# Patient Record
Sex: FEMALE | Race: WHITE
[De-identification: names, ages, dates, MRNs, and addresses within clinical notes are randomized per-mention and may not be internally consistent; named-entity substitution may affect disease eponyms.]

---

## 2021-03-11 ENCOUNTER — HOSPITAL ENCOUNTER (OUTPATIENT)
Dept: HOSPITAL 61 - KCIC US | Age: 21
End: 2021-03-11
Attending: NURSE PRACTITIONER
Payer: COMMERCIAL

## 2021-03-11 DIAGNOSIS — R10.11: Primary | ICD-10-CM

## 2021-03-11 PROCEDURE — 76700 US EXAM ABDOM COMPLETE: CPT

## 2021-03-11 NOTE — [facility]
EXAM: Abdomen sonogram.



HISTORY: Pain.



TECHNIQUE: Sonographic imaging of the abdomen was performed.



COMPARISON: None.



FINDINGS: The liver is normal in size. No focal hepatic lesion is seen. The gallbladder is unremarkab
le. The common bile duct is normal in caliber. The kidneys, spleen and aorta and inferior vena cava a
re unremarkable. The pancreas is partially obscured due to bowel gas.



IMPRESSION: Unremarkable abdomen sonogram, with limited evaluation of the pancreas due to bowel gas.



Electronically signed by: Grecia Vargas MD (3/11/2021 11:45 AM) [id number]